# Patient Record
Sex: MALE | Race: WHITE
[De-identification: names, ages, dates, MRNs, and addresses within clinical notes are randomized per-mention and may not be internally consistent; named-entity substitution may affect disease eponyms.]

---

## 2021-08-15 ENCOUNTER — TRANSCRIPTION ENCOUNTER (OUTPATIENT)
Age: 41
End: 2021-08-15

## 2022-03-26 ENCOUNTER — TRANSCRIPTION ENCOUNTER (OUTPATIENT)
Age: 42
End: 2022-03-26

## 2022-10-05 ENCOUNTER — APPOINTMENT (OUTPATIENT)
Dept: ORTHOPEDIC SURGERY | Facility: CLINIC | Age: 42
End: 2022-10-05
Payer: COMMERCIAL

## 2022-10-05 DIAGNOSIS — M25.461 EFFUSION, RIGHT KNEE: ICD-10-CM

## 2022-10-05 DIAGNOSIS — M25.561 PAIN IN RIGHT KNEE: ICD-10-CM

## 2022-10-05 PROBLEM — Z00.00 ENCOUNTER FOR PREVENTIVE HEALTH EXAMINATION: Status: ACTIVE | Noted: 2022-10-05

## 2022-10-05 PROCEDURE — 99203 OFFICE O/P NEW LOW 30 MIN: CPT | Mod: 25

## 2022-10-05 PROCEDURE — J3490M: CUSTOM

## 2022-10-05 PROCEDURE — 76881 US COMPL JOINT R-T W/IMG: CPT | Mod: RT,59

## 2022-10-05 PROCEDURE — 20611 DRAIN/INJ JOINT/BURSA W/US: CPT

## 2022-10-06 PROBLEM — M25.561 RIGHT KNEE PAIN: Status: ACTIVE | Noted: 2022-10-05

## 2022-10-06 PROBLEM — M25.461 EFFUSION OF RIGHT KNEE: Status: ACTIVE | Noted: 2022-10-06

## 2022-10-06 NOTE — HISTORY OF PRESENT ILLNESS
[de-identified] : The patient is a 40 yo man presenting with right knee pain of 5 days. The patient reports helping lift furniture on this past Sunday and twisting his knee when picking something up. He had a previous meniscus tear years ago. He reports with increased swelling, decreased ROM, and increased pain of his knee. He has pain with ambulation and flexion of the knee. He does not have pain with rest or at night. He has tried advil with some relief. He denies paresthesias or numbness. He denies any ankle pain.

## 2022-10-06 NOTE — PHYSICAL EXAM
[Normal Coordination] : normal coordination [Normal Sensation] : normal sensation [Normal Mood and Affect] : normal mood and affect [Orientated] : orientated [Able to Communicate] : able to communicate [Normal Skin] : normal skin [Well Developed] : well developed [Well Nourished] : well nourished [Peripheral vascular exam is grossly normal] : peripheral vascular exam is grossly normal [NL (0)] : extension 0 degrees [5___] : hamstring 5[unfilled]/5 [Equivocal] : equivocal Koko [Negative] : negative posterior draw [Right] : right knee [There are no fractures, subluxations or dislocations. No significant abnormalities are seen] : There are no fractures, subluxations or dislocations. No significant abnormalities are seen [] : no posterior pain with flexion [TWNoteComboBox7] : flexion 100 degrees

## 2022-10-06 NOTE — PROCEDURE
[Right] : of the right [Knee] : knee [Pain] : pain [Inflammation] : inflammation [Betadine] : betadine [___ cc    0.25%] : Bupivacaine (Marcaine) ~Vcc of 0.25%  [___ cc    80mg] : Methylprednisolone (Depomedrol) ~Vcc of 80 mg  [] : Patient tolerated procedure well [Call if redness, pain or fever occur] : call if redness, pain or fever occur [Apply ice for 15min out of every hour for the next 12-24 hours as tolerated] : apply ice for 15 minutes out of every hour for the next 12-24 hours as tolerated [Previous OTC use and PT nontherapeutic] : patient has tried OTC's including aspirin, Ibuprofen, Aleve, etc or prescription NSAIDS, and/or exercises at home and/or physical therapy without satisfactory response [Patient had decreased mobility in the joint] : patient had decreased mobility in the joint [Risks, benefits, alternatives discussed / Verbal consent obtained] : the risks benefits, and alternatives have been discussed, and verbal consent was obtained [All ultrasound images have been permanently captured and stored accordingly in our picture archiving and communication system] : All ultrasound images have been permanently captured and stored accordingly in our picture archiving and communication system [de-identified] : Knee joint etc.

## 2022-10-06 NOTE — DISCUSSION/SUMMARY
[de-identified] : The patient has a large right knee effusion with history of meniscal tear.\par \par The patient is amenable to a knee aspiration, drainage and steroid injection. \par He tolerated it well.\par 70cc of straw colored joint fluid was aspirated. \par The patient tolerated his knee steroid injection well.\par He will follow up with Dr. Paul as needed.

## 2022-10-12 ENCOUNTER — APPOINTMENT (OUTPATIENT)
Dept: ORTHOPEDIC SURGERY | Facility: CLINIC | Age: 42
End: 2022-10-12

## 2023-02-22 ENCOUNTER — OFFICE (OUTPATIENT)
Dept: URBAN - METROPOLITAN AREA CLINIC 113 | Facility: CLINIC | Age: 43
Setting detail: OPHTHALMOLOGY
End: 2023-02-22
Payer: COMMERCIAL

## 2023-02-22 DIAGNOSIS — H11.32: ICD-10-CM

## 2023-02-22 DIAGNOSIS — S05.02XA: ICD-10-CM

## 2023-02-22 PROCEDURE — 92004 COMPRE OPH EXAM NEW PT 1/>: CPT | Performed by: STUDENT IN AN ORGANIZED HEALTH CARE EDUCATION/TRAINING PROGRAM

## 2023-02-22 ASSESSMENT — CONFRONTATIONAL VISUAL FIELD TEST (CVF)
OS_FINDINGS: FULL
OD_FINDINGS: FULL

## 2023-02-22 ASSESSMENT — VISUAL ACUITY
OS_BCVA: 20/20
OD_BCVA: 20/20

## 2023-02-22 ASSESSMENT — LID EXAM ASSESSMENTS: OS_COMMENTS: LIDS EVERTED

## 2023-04-07 ENCOUNTER — APPOINTMENT (OUTPATIENT)
Dept: ORTHOPEDIC SURGERY | Facility: CLINIC | Age: 43
End: 2023-04-07
Payer: COMMERCIAL

## 2023-04-07 VITALS — BODY MASS INDEX: 28.63 KG/M2 | WEIGHT: 200 LBS | HEIGHT: 70 IN

## 2023-04-07 DIAGNOSIS — Z78.9 OTHER SPECIFIED HEALTH STATUS: ICD-10-CM

## 2023-04-07 PROCEDURE — 73562 X-RAY EXAM OF KNEE 3: CPT | Mod: LT

## 2023-04-07 PROCEDURE — 99214 OFFICE O/P EST MOD 30 MIN: CPT

## 2023-04-07 RX ORDER — CEPHALEXIN 500 MG/1
500 TABLET ORAL EVERY 8 HOURS
Qty: 30 | Refills: 0 | Status: ACTIVE | COMMUNITY
Start: 2023-04-07 | End: 1900-01-01

## 2023-04-07 RX ORDER — CELECOXIB 200 MG/1
200 CAPSULE ORAL TWICE DAILY
Qty: 30 | Refills: 2 | Status: ACTIVE | COMMUNITY
Start: 2023-04-07 | End: 1900-01-01

## 2023-04-07 NOTE — HISTORY OF PRESENT ILLNESS
[8] : 8 [0] : 0 [Dull/Aching] : dull/aching [Localized] : localized [Sharp] : sharp [Standing] : standing [Walking] : walking [Stairs] : stairs [Full time] : Work status: full time [de-identified] : 4/7/2023: 43 yo  here with 24 hrs left knee anterior swelling and pain.\par There is no hx of trauma.\par Pt denies instability and is requesting knee to be drained.\par There is no hx of fevers or chills.\par \par PMH: denied.\par Allergies: nkda [] : Post Surgical Visit: no [FreeTextEntry1] : Lt knee [FreeTextEntry3] : 4/6/23 [FreeTextEntry5] : Patient is here with knee pain since last night on 4/6/23, no injury \par no prior issues

## 2023-04-07 NOTE — ASSESSMENT
[FreeTextEntry1] : Pt provided Keflex 500 mg tid  x 10 days (recommend 1,000 mg as soon as he returns home today).\par Recommend heat compress qid x 30 minutes.\par I have placed a pen martín around the area of erythema and instructed patient to go over this with a Sharpie when he returns home.\par If the erythema spreads past the drawn line (tracking), if he notes progressive symptoms such as worsening pain, fevers,chills, he is to go to ED immediately for tx with IV antibiotics.\par Both Mr. Alberto and his sister state that they understand.\par Pt will rto in 1 week (he is traveling to [place] for vacation for several days) for f/u care.

## 2023-04-07 NOTE — IMAGING
[de-identified] : Left knee with anterior erythema / 12 x 8 cm. There is ttp over the anterior knee only. mild increased warmth is noted to the anterior knee.  ROM is full.  There is no ligamentous laxity noted. Spring Sign is negative.  Left hip with full and pain free ROM Gait is minimally antalgic to the left.

## 2023-04-10 ENCOUNTER — NON-APPOINTMENT (OUTPATIENT)
Age: 43
End: 2023-04-10

## 2023-04-17 ENCOUNTER — APPOINTMENT (OUTPATIENT)
Dept: ORTHOPEDIC SURGERY | Facility: CLINIC | Age: 43
End: 2023-04-17
Payer: COMMERCIAL

## 2023-04-17 VITALS — WEIGHT: 200 LBS | HEIGHT: 70 IN | BODY MASS INDEX: 28.63 KG/M2

## 2023-04-17 DIAGNOSIS — Z78.9 OTHER SPECIFIED HEALTH STATUS: ICD-10-CM

## 2023-04-17 PROCEDURE — 99214 OFFICE O/P EST MOD 30 MIN: CPT

## 2023-04-17 RX ORDER — SULFAMETHOXAZOLE AND TRIMETHOPRIM 800; 160 MG/1; MG/1
800-160 TABLET ORAL TWICE DAILY
Qty: 14 | Refills: 0 | Status: ACTIVE | COMMUNITY
Start: 2023-04-17 | End: 1900-01-01

## 2023-04-17 NOTE — HISTORY OF PRESENT ILLNESS
[Gradual] : gradual [1] : 2 [0] : 0 [Localized] : localized [Full time] : Work status: full time [de-identified] : 4/17/23: Completed Keflex for L knee cellulitis; feeling much better.  Saw Roberth BUSTAMANTE in [facility].  Also seen in ED on 4/10 after noticing L calf swelling and pain.  Had negative sono for DVT.  Given Rx for Bactrim at that time, which he is still taking.  No f/c/s, SOB, CP. [] : no [FreeTextEntry1] : l knee [FreeTextEntry5] : pt had sharp pain and swelling for a few weeks , no injury [FreeTextEntry9] : antibiotics [de-identified] : 4-7-23 [de-identified] : ROBBY Wallace [de-identified] : bernardo done ER in [place], [place] [de-identified] : construction

## 2023-04-17 NOTE — PHYSICAL EXAM
[Left] : left knee [NL (0)] : extension 0 degrees [5___] : quadriceps 5[unfilled]/5 [] : non-antalgic [FreeTextEntry3] : anterior swelling over proximal patella - some skin peeling - minor warmth and erythema.  no tenderness.  calf soft, nt. [TWNoteComboBox7] : flexion 135 degrees

## 2023-04-24 ENCOUNTER — APPOINTMENT (OUTPATIENT)
Dept: ORTHOPEDIC SURGERY | Facility: CLINIC | Age: 43
End: 2023-04-24
Payer: COMMERCIAL

## 2023-04-24 VITALS — BODY MASS INDEX: 28.63 KG/M2 | WEIGHT: 200 LBS | HEIGHT: 70 IN

## 2023-04-24 DIAGNOSIS — L03.116 CELLULITIS OF LEFT LOWER LIMB: ICD-10-CM

## 2023-04-24 DIAGNOSIS — S83.206A UNSPECIFIED TEAR OF UNSPECIFIED MENISCUS, CURRENT INJURY, RIGHT KNEE, INITIAL ENCOUNTER: ICD-10-CM

## 2023-04-24 PROCEDURE — 99213 OFFICE O/P EST LOW 20 MIN: CPT

## 2023-04-24 NOTE — ASSESSMENT
[FreeTextEntry1] : Will complete Bactrim, then for another week.\par Will fu in 7 days for re eval.\par \par 04/24/2023: repetitively advised to finished Abx. \par Obtain MRI of the left knee to eval for LJL pain. \par erythema near resolved. \par Questions addressed. Activity modifier as tolerated.\par  \par The documentation recorded by the scribe accurately reflects the service I personally performed and the decisions made by me.\par I, Justin Sungibe, attest that this documentation has been prepared under the direction and in the presence of Provider Yimi Perez MD.\par \par The patient was seen by Yiim Perez MD.\par \par

## 2023-04-24 NOTE — HISTORY OF PRESENT ILLNESS
[Gradual] : gradual [2] : 2 [Radiating] : radiating [Shooting] : shooting [Full time] : Work status: full time [de-identified] : 4/17/23: Completed Keflex for L knee cellulitis; feeling much better.  Saw Roberth BUSTAMANTE in UC.  Also seen in ED on 4/10 after noticing L calf swelling and pain.  Had negative sono for DVT.  Given Rx for Bactrim at that time, which he is still taking.  No f/c/s, SOB, CP.\par \par 04/24/2023 Here for f/u on the left knee. Has a few days left on his antibiotics, also mentions having tooth infection so he had been on antibiotics for approx. 3 wks. Some anterior and lateral discomfort.  [] : no [FreeTextEntry1] : l knee [FreeTextEntry5] : pt had sharp pain and swelling for a few weeks , no injury [FreeTextEntry7] : calf [FreeTextEntry9] : antibiotics,anti inflammatory at night occasionally [de-identified] : stairs  with weight [de-identified] : construction

## 2023-04-24 NOTE — PHYSICAL EXAM
[Left] : left knee [NL (0)] : extension 0 degrees [5___] : quadriceps 5[unfilled]/5 [] : non-antalgic [FreeTextEntry3] : near resolved erythema.  calf soft, nt. [TWNoteComboBox7] : flexion 135 degrees

## 2023-04-26 ENCOUNTER — RESULT REVIEW (OUTPATIENT)
Age: 43
End: 2023-04-26

## 2023-09-15 ENCOUNTER — NON-APPOINTMENT (OUTPATIENT)
Age: 43
End: 2023-09-15

## 2023-09-22 ENCOUNTER — NON-APPOINTMENT (OUTPATIENT)
Age: 43
End: 2023-09-22

## 2024-01-12 ENCOUNTER — NON-APPOINTMENT (OUTPATIENT)
Age: 44
End: 2024-01-12

## 2024-08-08 ENCOUNTER — APPOINTMENT (OUTPATIENT)
Dept: MRI IMAGING | Facility: CLINIC | Age: 44
End: 2024-08-08

## 2024-08-08 PROCEDURE — 73721 MRI JNT OF LWR EXTRE W/O DYE: CPT | Mod: RT

## 2024-08-20 ENCOUNTER — APPOINTMENT (OUTPATIENT)
Dept: ORTHOPEDIC SURGERY | Facility: CLINIC | Age: 44
End: 2024-08-20
Payer: COMMERCIAL

## 2024-08-20 VITALS — HEIGHT: 70 IN | BODY MASS INDEX: 28.63 KG/M2 | WEIGHT: 200 LBS

## 2024-08-20 DIAGNOSIS — M25.561 PAIN IN RIGHT KNEE: ICD-10-CM

## 2024-08-20 DIAGNOSIS — M79.18 MYALGIA, OTHER SITE: ICD-10-CM

## 2024-08-20 DIAGNOSIS — M70.41 PREPATELLAR BURSITIS, RIGHT KNEE: ICD-10-CM

## 2024-08-20 PROCEDURE — 99204 OFFICE O/P NEW MOD 45 MIN: CPT

## 2024-08-20 RX ORDER — DICLOFENAC SODIUM 10 MG/G
1 GEL TOPICAL
Qty: 100 | Refills: 2 | Status: ACTIVE | COMMUNITY
Start: 2024-08-20 | End: 1900-01-01

## 2024-08-20 RX ORDER — SULFAMETHOXAZOLE AND TRIMETHOPRIM 800; 160 MG/1; MG/1
800-160 TABLET ORAL
Qty: 10 | Refills: 0 | Status: ACTIVE | COMMUNITY
Start: 2024-08-20 | End: 1900-01-01

## 2024-08-20 RX ORDER — METHYLPREDNISOLONE 4 MG/1
4 TABLET ORAL
Qty: 21 | Refills: 0 | Status: ACTIVE | COMMUNITY
Start: 2024-08-20 | End: 1900-01-01

## 2024-08-21 PROBLEM — M79.18 MUSCULOSKELETAL PAIN: Status: ACTIVE | Noted: 2024-08-21

## 2024-08-21 PROBLEM — M70.41 PREPATELLAR BURSITIS OF RIGHT KNEE: Status: ACTIVE | Noted: 2024-08-04

## 2024-08-21 NOTE — HISTORY OF PRESENT ILLNESS
[de-identified] : The patient is a 43 year old [RIGHT] hand dominant male who presents today complaining of right knee.   Date of Injury/Onset: end of July 2024.  Pain:    At Rest: 3/10  With Activity:  3/10  Mechanism of injury: No specific cause of injury/ trauma, patient reports he returned from vacation and was experience swelling.  This is NOT a Work Related Injury being treated under Worker's Compensation. This is NOT an athletic injury occurring associated with an interscholastic or organized sports team. Quality of symptoms: swelling, throbbing, weakness, denies redness or drainage/ denies fever Improves with: rest Worse with: walking, stairs, bending  Prior treatment: Lamberto  Prior Imaging: xr, MRI  Out of work/sport: _, since _ School/Sport/Position/Occupation: [employer]  Additional Information: None

## 2024-08-21 NOTE — PHYSICAL EXAM
[de-identified] : Neurovascularly intact distally  Right Knee: Full ROM Ligamental stable pre-patellar bursa swelling and tenderness no erythema or drainage

## 2024-08-21 NOTE — HISTORY OF PRESENT ILLNESS
[de-identified] : The patient is a 43 year old [RIGHT] hand dominant male who presents today complaining of right knee.   Date of Injury/Onset: end of July 2024.  Pain:    At Rest: 3/10  With Activity:  3/10  Mechanism of injury: No specific cause of injury/ trauma, patient reports he returned from vacation and was experience swelling.  This is NOT a Work Related Injury being treated under Worker's Compensation. This is NOT an athletic injury occurring associated with an interscholastic or organized sports team. Quality of symptoms: swelling, throbbing, weakness, denies redness or drainage/ denies fever Improves with: rest Worse with: walking, stairs, bending  Prior treatment: Lamberto  Prior Imaging: xr, MRI  Out of work/sport: _, since _ School/Sport/Position/Occupation: [employer]  Additional Information: None

## 2024-08-21 NOTE — DISCUSSION/SUMMARY
[de-identified] : Reviewed all MRI images with patient today and interpretation was provided. Packet of exercises provided for home strengthening and/or stretching. Advised patient to wear Reparel knee sleeve, informational card provided. Rx Voltaren gel. Rx Bactrim. Rx Medrol Dose Zan. Packet of exercises provided for home strengthening and/or stretching. Patient will follow up as needed.     ----------------------------------------------- Home Exercise The patient is instructed on a home exercise program.   VIVIENNE CARROLL Acting as a Scribe for Dr. Beverly VALLADARES, Vivienne Carroll, attest that this documentation has been prepared under the direction and in the presence of Provider Dr. Paul.   Activity Modification The patient was advised to modify their activities.   Dx / Natural History The patient was advised of the diagnosis.  The natural history of the pathology was explained in full to the patient in layman's terms.  Several different treatment options were discussed and explained in full to the patient including the risks and benefits of both surgical and non-surgical treatments.  All questions and concerns were answered.   Pain Guide Activities The patient was advised to let pain guide the gradual advancement of activities.   RICE I explained to the patient that rest, ice, compression, and elevation would benefit them.  They may return to activity after follow-up or when they no longer have any pain.   The patient's current medication management of their orthopedic diagnosis was reviewed today: (1) We discussed a comprehensive treatment plan that included possible pharmaceutical management involving the use of prescription strength medications including but not limited to options such as oral Naprosyn 500mg BID, once daily Meloxicam 15 mg, or 500-650 mg Tylenol versus over the counter oral medications and topical prescription NSAID Pennsaid vs over the counter Voltaren gel. (2) There is a moderate risk of morbidity with further treatment, especially from use of prescription strength medications and possible side effects of these medications which include upset stomach with oral medications, skin reactions to topical medications and cardiac/renal issues with long term use. (3) I recommended that the patient follow-up with their medical physician to discuss any significant specific potential issues with long term medication use such as interactions with current medications or with exacerbation of underlying medical comorbidities. (4) The benefits and risks associated with use of injectable, oral or topical, prescription and over the counter anti-inflammatory medications were discussed with the patient. The patient voiced understanding of the risks including but not limited to bleeding, stroke, kidney dysfunction, heart disease, and were referred to the black box warning label for further information.

## 2024-08-21 NOTE — ADDENDUM
[FreeTextEntry1] : Documented by Richmond Hughes acting as a scribe for Dr. Paul and Shen Raman PA-C on 08/20/2024 and was presence for the following sections: Physical Exam; Data Reviewed; Assessment; Discussion/Summary. All medical record entries made by the Scribe were at my, Dr. Paul, and Shen Raman, direction and personally dictated by me on 08/20/2024. I have reviewed the chart and agree that the record accurately reflects my personal performance of the history, physical exam, procedure and imaging.

## 2024-08-21 NOTE — DISCUSSION/SUMMARY
[de-identified] : Reviewed all MRI images with patient today and interpretation was provided. Packet of exercises provided for home strengthening and/or stretching. Advised patient to wear Reparel knee sleeve, informational card provided. Rx Voltaren gel. Rx Bactrim. Rx Medrol Dose Zan. Packet of exercises provided for home strengthening and/or stretching. Patient will follow up as needed.     ----------------------------------------------- Home Exercise The patient is instructed on a home exercise program.   VIVIENNE CARROLL Acting as a Scribe for Dr. Beverly VALLADARES, Vivienne Carroll, attest that this documentation has been prepared under the direction and in the presence of Provider Dr. Paul.   Activity Modification The patient was advised to modify their activities.   Dx / Natural History The patient was advised of the diagnosis.  The natural history of the pathology was explained in full to the patient in layman's terms.  Several different treatment options were discussed and explained in full to the patient including the risks and benefits of both surgical and non-surgical treatments.  All questions and concerns were answered.   Pain Guide Activities The patient was advised to let pain guide the gradual advancement of activities.   RICE I explained to the patient that rest, ice, compression, and elevation would benefit them.  They may return to activity after follow-up or when they no longer have any pain.   The patient's current medication management of their orthopedic diagnosis was reviewed today: (1) We discussed a comprehensive treatment plan that included possible pharmaceutical management involving the use of prescription strength medications including but not limited to options such as oral Naprosyn 500mg BID, once daily Meloxicam 15 mg, or 500-650 mg Tylenol versus over the counter oral medications and topical prescription NSAID Pennsaid vs over the counter Voltaren gel. (2) There is a moderate risk of morbidity with further treatment, especially from use of prescription strength medications and possible side effects of these medications which include upset stomach with oral medications, skin reactions to topical medications and cardiac/renal issues with long term use. (3) I recommended that the patient follow-up with their medical physician to discuss any significant specific potential issues with long term medication use such as interactions with current medications or with exacerbation of underlying medical comorbidities. (4) The benefits and risks associated with use of injectable, oral or topical, prescription and over the counter anti-inflammatory medications were discussed with the patient. The patient voiced understanding of the risks including but not limited to bleeding, stroke, kidney dysfunction, heart disease, and were referred to the black box warning label for further information.

## 2024-08-21 NOTE — DATA REVIEWED
[FreeTextEntry1] : 8/8/24 [facility] MRI Right Knee: This scan was reviewed and interpreted by Dr. Paul, and his findings are- Impression: 1) Anterior subcutaneous edema with minimal prepatellar bursitis and patellar thickening and tendonitis, correlation for an inflammatory process is suggested. 2) Minimal joint effusion and deep infrapatellar bursitis. Minimal fluid or a small ganglion over the tibiofibular joint. 3) Trace semimembranosus bursitis with small ganglion cyst or vessel in the semimembranosus musculotendinous junction.

## 2024-08-21 NOTE — PHYSICAL EXAM
[de-identified] : Neurovascularly intact distally  Right Knee: Full ROM Ligamental stable pre-patellar bursa swelling and tenderness no erythema or drainage

## 2025-03-06 ENCOUNTER — APPOINTMENT (OUTPATIENT)
Dept: ORTHOPEDIC SURGERY | Facility: CLINIC | Age: 45
End: 2025-03-06
Payer: COMMERCIAL

## 2025-03-06 DIAGNOSIS — M22.2X1 PATELLOFEMORAL DISORDERS, RIGHT KNEE: ICD-10-CM

## 2025-03-06 DIAGNOSIS — M22.2X2 PATELLOFEMORAL DISORDERS, LEFT KNEE: ICD-10-CM

## 2025-03-06 PROCEDURE — 99214 OFFICE O/P EST MOD 30 MIN: CPT

## 2025-03-06 PROCEDURE — 73562 X-RAY EXAM OF KNEE 3: CPT | Mod: LT

## 2025-03-06 RX ORDER — DICLOFENAC SODIUM 10 MG/G
1 GEL TOPICAL
Qty: 1 | Refills: 2 | Status: ACTIVE | COMMUNITY
Start: 2025-03-06 | End: 1900-01-01

## 2025-03-06 RX ORDER — METHYLPREDNISOLONE 4 MG/1
4 TABLET ORAL
Qty: 1 | Refills: 0 | Status: ACTIVE | COMMUNITY
Start: 2025-03-06 | End: 1900-01-01

## 2025-03-22 ENCOUNTER — NON-APPOINTMENT (OUTPATIENT)
Age: 45
End: 2025-03-22

## 2025-04-03 ENCOUNTER — APPOINTMENT (OUTPATIENT)
Dept: ORTHOPEDIC SURGERY | Facility: CLINIC | Age: 45
End: 2025-04-03
Payer: COMMERCIAL

## 2025-04-03 DIAGNOSIS — M22.2X1 PATELLOFEMORAL DISORDERS, RIGHT KNEE: ICD-10-CM

## 2025-04-03 DIAGNOSIS — M22.2X2 PATELLOFEMORAL DISORDERS, LEFT KNEE: ICD-10-CM

## 2025-04-03 PROCEDURE — 20610 DRAIN/INJ JOINT/BURSA W/O US: CPT | Mod: 50

## 2025-04-03 PROCEDURE — 99214 OFFICE O/P EST MOD 30 MIN: CPT | Mod: 25

## 2025-04-03 PROCEDURE — 99213 OFFICE O/P EST LOW 20 MIN: CPT | Mod: 25

## 2025-06-05 ENCOUNTER — APPOINTMENT (OUTPATIENT)
Dept: ORTHOPEDIC SURGERY | Facility: CLINIC | Age: 45
End: 2025-06-05